# Patient Record
Sex: FEMALE | Race: WHITE | NOT HISPANIC OR LATINO | Employment: FULL TIME | URBAN - METROPOLITAN AREA
[De-identification: names, ages, dates, MRNs, and addresses within clinical notes are randomized per-mention and may not be internally consistent; named-entity substitution may affect disease eponyms.]

---

## 2017-01-05 ENCOUNTER — HOSPITAL ENCOUNTER (OUTPATIENT)
Dept: ULTRASOUND IMAGING | Facility: HOSPITAL | Age: 47
Discharge: HOME | End: 2017-01-05
Attending: INTERNAL MEDICINE | Admitting: INTERNAL MEDICINE

## 2017-01-05 DIAGNOSIS — E04.1 THYROID NODULE: ICD-10-CM

## 2017-01-05 PROCEDURE — 76536 US EXAM OF HEAD AND NECK: CPT

## 2017-02-08 ENCOUNTER — OFFICE VISIT (OUTPATIENT)
Dept: INTERNAL MEDICINE | Facility: CLINIC | Age: 47
End: 2017-02-08

## 2017-02-08 VITALS
BODY MASS INDEX: 24.49 KG/M2 | DIASTOLIC BLOOD PRESSURE: 80 MMHG | SYSTOLIC BLOOD PRESSURE: 100 MMHG | WEIGHT: 152.9 LBS | OXYGEN SATURATION: 97 % | HEART RATE: 117 BPM

## 2017-02-08 DIAGNOSIS — R53.83 FATIGUE, UNSPECIFIED TYPE: ICD-10-CM

## 2017-02-08 DIAGNOSIS — E04.1 THYROID NODULE: Primary | ICD-10-CM

## 2017-02-08 PROCEDURE — 99213 OFFICE O/P EST LOW 20 MIN: CPT | Performed by: INTERNAL MEDICINE

## 2017-02-08 RX ORDER — ZOLPIDEM TARTRATE 10 MG/1
TABLET ORAL
COMMUNITY
Start: 2017-02-07 | End: 2017-07-10

## 2017-02-08 NOTE — PROGRESS NOTES
Subjective   Froilan Dickens is a 46 y.o. female.     Chief Complaint   Patient presents with   • Fatigue         History of Present Illness   Fatigue and f/u on us thyroid results.  The following portions of the patient's history were reviewed and updated as appropriate: allergies, current medications, past family history, past medical history, past social history, past surgical history and problem list.    Review of Systems   Constitutional: Positive for fatigue. Negative for activity change, appetite change, chills, diaphoresis, fever and unexpected weight change.   HENT: Negative for congestion, ear discharge, ear pain, mouth sores, nosebleeds, sinus pressure, sneezing and sore throat.    Eyes: Negative for pain, discharge and itching.   Respiratory: Negative for cough, chest tightness, shortness of breath and wheezing.    Cardiovascular: Negative for chest pain, palpitations and leg swelling.   Gastrointestinal: Negative for abdominal pain, constipation, diarrhea, nausea and vomiting.   Endocrine: Negative for cold intolerance, heat intolerance, polydipsia and polyphagia.   Genitourinary: Negative for dysuria, flank pain, frequency, hematuria and urgency.   Musculoskeletal: Negative for arthralgias, back pain, gait problem, myalgias, neck pain and neck stiffness.   Skin: Negative for color change, pallor and rash.   Neurological: Negative for seizures, speech difficulty, numbness and headaches.   Psychiatric/Behavioral: Negative for agitation, confusion, decreased concentration and sleep disturbance. The patient is not nervous/anxious.      Visit Vitals   • /80   • Pulse 117   • Wt 152 lb 14.4 oz (69.4 kg)   • SpO2 97%   • BMI 24.49 kg/m2       Objective   Physical Exam   Constitutional: She appears well-developed.   HENT:   Head: Normocephalic.   Right Ear: External ear normal.   Left Ear: External ear normal.   Nose: Nose normal.   Mouth/Throat: Oropharynx is clear and moist.   Eyes: Conjunctivae are  normal. Pupils are equal, round, and reactive to light.   Neck: No JVD present. No thyromegaly present.   Cardiovascular: Normal rate, regular rhythm and normal heart sounds.  Exam reveals no friction rub.    No murmur heard.  Pulmonary/Chest: Effort normal and breath sounds normal. No respiratory distress. She has no wheezes. She has no rales.   Abdominal: Soft. Bowel sounds are normal. She exhibits no distension. There is no tenderness. There is no guarding.   Musculoskeletal: She exhibits no edema or tenderness.   Lymphadenopathy:     She has no cervical adenopathy.   Neurological: She displays normal reflexes. No cranial nerve deficit.   Skin: No rash noted.   Psychiatric: Her behavior is normal.   Nursing note and vitals reviewed.      Assessment/Plan   Froilan was seen today for fatigue.    Diagnoses and all orders for this visit:    Thyroid nodule  -     Ambulatory Referral to Endocrinology  Explained very tiny nodules on thyroid exam.She still insists on seeing Endo. Ref put in.  Fatigue, unspecified type    She had issues with throat and thinks fatigue related . Seeing ENT

## 2017-04-18 ENCOUNTER — OFFICE VISIT (OUTPATIENT)
Dept: INTERNAL MEDICINE | Facility: CLINIC | Age: 47
End: 2017-04-18

## 2017-04-18 VITALS
SYSTOLIC BLOOD PRESSURE: 90 MMHG | OXYGEN SATURATION: 98 % | BODY MASS INDEX: 25.65 KG/M2 | HEIGHT: 67 IN | WEIGHT: 163.4 LBS | HEART RATE: 105 BPM | DIASTOLIC BLOOD PRESSURE: 70 MMHG

## 2017-04-18 DIAGNOSIS — Z00.00 HEALTH CARE MAINTENANCE: Primary | ICD-10-CM

## 2017-04-18 DIAGNOSIS — R31.29 MICROSCOPIC HEMATURIA: ICD-10-CM

## 2017-04-18 LAB
ALBUMIN SERPL-MCNC: 4.8 G/DL (ref 3.2–4.8)
ALBUMIN/GLOB SERPL: 1.7 G/DL (ref 1.5–2.5)
ALP SERPL-CCNC: 112 U/L (ref 25–100)
ALT SERPL W P-5'-P-CCNC: 20 U/L (ref 7–40)
ANION GAP SERPL CALCULATED.3IONS-SCNC: 15 MMOL/L (ref 3–11)
ARTICHOKE IGE QN: 141 MG/DL (ref 0–130)
AST SERPL-CCNC: 26 U/L (ref 0–33)
BILIRUB BLD-MCNC: NEGATIVE MG/DL
BILIRUB SERPL-MCNC: 0.3 MG/DL (ref 0.3–1.2)
BUN BLD-MCNC: 11 MG/DL (ref 9–23)
BUN/CREAT SERPL: 13.8 (ref 7–25)
CALCIUM SPEC-SCNC: 9.3 MG/DL (ref 8.7–10.4)
CHLORIDE SERPL-SCNC: 107 MMOL/L (ref 99–109)
CHOLEST SERPL-MCNC: 212 MG/DL (ref 0–200)
CLARITY, POC: CLEAR
CO2 SERPL-SCNC: 19 MMOL/L (ref 20–31)
COLOR UR: YELLOW
CREAT BLD-MCNC: 0.8 MG/DL (ref 0.6–1.3)
GFR SERPL CREATININE-BSD FRML MDRD: 77 ML/MIN/1.73
GLOBULIN UR ELPH-MCNC: 2.9 GM/DL
GLUCOSE BLD-MCNC: 91 MG/DL (ref 70–100)
GLUCOSE UR STRIP-MCNC: NEGATIVE MG/DL
HDLC SERPL-MCNC: 71 MG/DL (ref 40–60)
KETONES UR QL: NEGATIVE
LEUKOCYTE EST, POC: NEGATIVE
NITRITE UR-MCNC: NEGATIVE MG/ML
PH UR: 6 [PH] (ref 5–8)
POTASSIUM BLD-SCNC: 4.6 MMOL/L (ref 3.5–5.5)
PROT SERPL-MCNC: 7.7 G/DL (ref 5.7–8.2)
PROT UR STRIP-MCNC: NEGATIVE MG/DL
RBC # UR STRIP: ABNORMAL /UL
SODIUM BLD-SCNC: 141 MMOL/L (ref 132–146)
SP GR UR: 1.02 (ref 1–1.03)
TRIGL SERPL-MCNC: 99 MG/DL (ref 0–150)
TSH SERPL DL<=0.05 MIU/L-ACNC: 1.01 MIU/ML (ref 0.35–5.35)
UROBILINOGEN UR QL: NORMAL

## 2017-04-18 PROCEDURE — 80053 COMPREHEN METABOLIC PANEL: CPT | Performed by: INTERNAL MEDICINE

## 2017-04-18 PROCEDURE — 80061 LIPID PANEL: CPT | Performed by: INTERNAL MEDICINE

## 2017-04-18 PROCEDURE — 84443 ASSAY THYROID STIM HORMONE: CPT | Performed by: INTERNAL MEDICINE

## 2017-04-18 PROCEDURE — 99396 PREV VISIT EST AGE 40-64: CPT | Performed by: INTERNAL MEDICINE

## 2017-04-18 PROCEDURE — 81003 URINALYSIS AUTO W/O SCOPE: CPT | Performed by: INTERNAL MEDICINE

## 2017-04-18 RX ORDER — ALPRAZOLAM 0.5 MG/1
TABLET ORAL
COMMUNITY
Start: 2017-04-04 | End: 2017-07-10

## 2017-04-18 RX ORDER — BREXPIPRAZOLE 0.5 MG/1
TABLET ORAL
COMMUNITY
Start: 2017-04-07

## 2017-04-18 RX ORDER — ATOMOXETINE 25 MG/1
CAPSULE ORAL
COMMUNITY
Start: 2017-04-05 | End: 2017-07-10

## 2017-04-18 NOTE — PROGRESS NOTES
"  Chief Complaint   Patient presents with   • Annual Exam         Reported Health  Good Yes  FairNo  PoorNo      Dental,Vision,Hearing  Regular dental visitsYes  Vision ProblemsNo  Hearing LossNo      Immunization Status:  Up To DateYes        Lifestyle  Healthy DietYes  Weight ConcernsYes  Regular ExerciseYes  Tobacco UseNo  Alcohol UseNo  Drug AbuseNo      Screening  Cancer ScreeningNo  Metabolic ScreeningNo  Risk ScreeningNo      Review of Systems   Constitutional: Negative for activity change, appetite change, chills, diaphoresis, fatigue, fever and unexpected weight change.   HENT: Negative for congestion, ear discharge, ear pain, mouth sores, nosebleeds, sinus pressure, sneezing and sore throat.    Eyes: Negative for pain, discharge and itching.   Respiratory: Negative for cough, chest tightness, shortness of breath and wheezing.    Cardiovascular: Negative for chest pain, palpitations and leg swelling.   Gastrointestinal: Negative for abdominal pain, constipation, diarrhea, nausea and vomiting.   Endocrine: Negative for cold intolerance, heat intolerance, polydipsia and polyphagia.   Genitourinary: Negative for dysuria, flank pain, frequency, hematuria and urgency.   Musculoskeletal: Negative for arthralgias, back pain, gait problem, myalgias, neck pain and neck stiffness.   Skin: Negative for color change, pallor and rash.   Neurological: Negative for seizures, speech difficulty, numbness and headaches.   Psychiatric/Behavioral: Negative for agitation, confusion, decreased concentration and sleep disturbance. The patient is not nervous/anxious.      BP 90/70  Pulse 105  Ht 66.5\" (168.9 cm)  Wt 163 lb 6.4 oz (74.1 kg)  SpO2 98%  BMI 25.98 kg/m2    Physical Exam   Constitutional: She is oriented to person, place, and time. She appears well-developed.   HENT:   Head: Normocephalic.   Right Ear: External ear normal.   Left Ear: External ear normal.   Nose: Nose normal.   Mouth/Throat: Oropharynx is clear " and moist.   Eyes: Conjunctivae are normal. Pupils are equal, round, and reactive to light.   Neck: No JVD present. No thyromegaly present.   Cardiovascular: Normal rate, regular rhythm and normal heart sounds.  Exam reveals no friction rub.    No murmur heard.  Pulmonary/Chest: Effort normal and breath sounds normal. No respiratory distress. She has no wheezes. She has no rales.   Abdominal: Soft. Bowel sounds are normal. She exhibits no distension. There is no tenderness. There is no guarding.   Musculoskeletal: She exhibits no edema or tenderness.   Lymphadenopathy:     She has no cervical adenopathy.   Neurological: She is oriented to person, place, and time. She has normal reflexes. She displays normal reflexes. No cranial nerve deficit.   Skin: No rash noted.   Psychiatric: She has a normal mood and affect. Her behavior is normal.   Nursing note and vitals reviewed.      Diet and Exercise    Healthy Diet Yes  Adequate DietYes  Poor DietNo  Adequate Exercise RegimenYes  Inadequate Exercise RegimenNo      Cervical Cancer Screening    Risks and benefits discussedYes  Screening currentYes  PAP Done Today OrderedNo  Screening Not IndicatedNo  Pap Every 3 yearsYes  Screening Done By GYNYes    Breast Cancer screening  Risks and Benefits DiscussedNo  Self Breast Exam taughtNo  Monthly Self Exam AdvisedYes  Screening CurrentNo  Mammogram OrderedNo  Screening Not IndicatedNo  Screening Managed By GYNYes  Patient DeclinesNo      STD Testing  ChlamydiaNo  GonorrheaNo  HIVNo      Osteoporosis Screening  Not indicated  Colorectal Cancer Screening    Screen for her at 51  Metabolic Screening  GlucoseYes  LipidsYes  CBCYes  TSHYes  UAYes  CMPYes  25OHYes      Immunizations  Risks and benefits discussedYes  Immunizations Up To DateYes  Immunizations NeededNo  Immunizations Per OrdersNo  Patient DNoeclines      Preventative Counseling  NutritionYes  Aerobic ExerciseYes  Weight Bearing ExerciseYes  Weight LossNo  Calcium  SupplementsYes  Vitamin D SupplementsYes  Reproductive HealthNo  Cardiovascular Risk ReductionYes  Tobacco CessationNo  Alcohol UseNo  Sunscreen UseYes  Self Skin ExaminationNo  Helmet UseNo  Seat Belt UseYes  Fall Risk ReductionNo  Advanced Directive PlanningNo      Patient Discussion  PatientYes  FamilyNo  CounselingYes  Froilan was seen today for annual exam.    Diagnoses and all orders for this visit:    Health care maintenance  -     POC Urinalysis Dipstick, Automated  -     Comprehensive Metabolic Panel  -     Lipid Panel  -     TSH    Microscopic hematuria  -     Urinalysis with microscope; Future    Sees april Stoll who writes for her strattera and xanax.

## 2017-07-10 ENCOUNTER — OFFICE VISIT (OUTPATIENT)
Dept: ENDOCRINOLOGY | Facility: CLINIC | Age: 47
End: 2017-07-10

## 2017-07-10 VITALS
DIASTOLIC BLOOD PRESSURE: 68 MMHG | HEART RATE: 102 BPM | BODY MASS INDEX: 27.31 KG/M2 | WEIGHT: 174 LBS | OXYGEN SATURATION: 99 % | SYSTOLIC BLOOD PRESSURE: 100 MMHG | HEIGHT: 67 IN

## 2017-07-10 DIAGNOSIS — E04.1 CYST OF THYROID DETERMINED BY ULTRASOUND: Primary | ICD-10-CM

## 2017-07-10 PROCEDURE — 76536 US EXAM OF HEAD AND NECK: CPT | Performed by: INTERNAL MEDICINE

## 2017-07-10 PROCEDURE — 99243 OFF/OP CNSLTJ NEW/EST LOW 30: CPT | Performed by: INTERNAL MEDICINE

## 2017-07-10 NOTE — PROGRESS NOTES
Chief Complaint   Patient presents with   • Thyroid Nodule     Consult for Dr. Gutiérrez        HPI:   Froilan Dickens is a 47 y.o.female sent in consultation by Prema Gutiérrez DO for further evaluation of her thyroid gland. Her history is as follows:    - pt reports initially having fluctuations in her TSH level in 2015 - 2016. Pt was on Seroquel and then Lamictal at the time. Pt's TFT's were then normal when those medications were discontinued. Pt had fatigue from insomnia which has improved since seeing a Sleep Therapist. Had hair los which has also resolved.  Lab Results   Component Value Date    TSH 1.015 04/18/2017       - Thyroid US completed at  radiology in  11/2016 and 01/2017 showed a micronodular pattern with microcysts but no discrete dominant nodules.     FMH: no known thyroid cancer, no known thyroid disease  PMH: no h/o irradiation of head, neck, or chest    Review of Systems   Constitutional: Positive for fatigue.   HENT: Positive for sore throat (occasional).    Eyes: Negative.    Respiratory: Negative.    Cardiovascular: Negative.    Gastrointestinal: Negative.    Endocrine: Negative.    Genitourinary: Negative.    Musculoskeletal: Negative.    Skin: Negative.    Allergic/Immunologic: Negative.    Neurological: Negative.    Hematological: Negative.    Psychiatric/Behavioral:        H/o depression     Past Medical History:   Diagnosis Date   • Endometriosis    • History of blood clots    • Hypertension    • Migraines      family history includes Diabetes in her father; Heart attack in her paternal uncle; Hypertension in her father; Migraines in her mother; Prostate cancer in her father; Stroke in her maternal grandfather and maternal grandmother.  Past Surgical History:   Procedure Laterality Date   • NO PAST SURGERIES       Social History   Substance Use Topics   • Smoking status: Never Smoker   • Smokeless tobacco: Not on file   • Alcohol use Yes      Comment: occasionally       Current  "Outpatient Prescriptions:   •  REXULTI 0.5 MG tablet, , Disp: , Rfl:   Allergies   Allergen Reactions   • Ampicillin    • Lamictal  [Lamotrigine]        /68  Pulse 102  Ht 66.5\" (168.9 cm)  Wt 174 lb (78.9 kg)  SpO2 99%  BMI 27.66 kg/m2  Physical Exam   Constitutional: She is oriented to person, place, and time. She appears well-developed. No distress.   HENT:   Head: Normocephalic.   Mouth/Throat: Oropharynx is clear and moist.   Eyes: Conjunctivae and EOM are normal. Pupils are equal, round, and reactive to light.   Neck: No tracheal deviation present. No thyromegaly present.   No palpable thyroid nodules     Cardiovascular: Normal rate, regular rhythm and normal heart sounds.    No murmur heard.  No tachycardia on my exam   Pulmonary/Chest: Effort normal and breath sounds normal. No respiratory distress.   Lymphadenopathy:     She has no cervical adenopathy.   Neurological: She is alert and oriented to person, place, and time. No cranial nerve deficit.   Skin: Skin is warm and dry. She is not diaphoretic. No erythema.   Psychiatric: She has a normal mood and affect. Her behavior is normal.   Vitals reviewed.    LABS/IMAGING: outside records reviewed and summarized [facility]  Lab Results   Component Value Date    TSH 1.015 04/18/2017     PROCEDURES (in office):  Thyroid Ultrasound Report  [facility]  [place], [place]    Date: 07/10/2017    Indication: thyroid nodule   Comparison Imaging:  radiology Thyroid US 11/02/2016, 01/05/2017  Clinical History: 47 y.o. Female with normal thyroid function and h/o abnormal thyroid US    Real time high resolution imaging of the thyroid gland was performed in transverse and longitudinal planes.  The right lobe measured 4.37 cm in Length x 1.33 cm in AP diameter x 1.92 cm in TV dimension.  The isthmus measured 0.24 cm in thickness.  The left thyroid lobe measured 4.09 cm in length x 1.30 cm in AP diameter x 1.72 cm in TV dimension.    The thyroid gland " appeared overall isoechoic with diffusely heterogeneous echotexture and normal vascularity.    No discrete solid nodules were identified in either lobe or isthmus.    Multiple small (2-3 mm) cysts were seen scattered throughout both lobes.     No pathologic lymph nodes were seen.     IMPRESSION:   1) The thyroid gland was normal in size by measured dimensions.   2) No discrete solid nodules were identified in either lobe or isthmus.  3) Multiple small (2-3 mm) cysts were seen scattered throughout both lobes.    RECOMMENDATIONS: Repeat Thyroid US recommended if changes in the gland are noted on physical exam.    ASSESSMENT/PLAN:  1) small, benign thyroid cysts:  Thyroid US performed in clinic today showed -  - The thyroid gland was normal in size by measured dimensions.   - No discrete solid nodules were identified in either lobe or isthmus.  -  Multiple small (2-3 mm) cysts were seen scattered throughout both lobes.  RECOMMENDATIONS: Repeat Thyroid US recommended if changes in the gland are noted on physical exam.    Reviewed images with patient. The small cysts identified do not meet criteria for FNA and do not need to be followed with serial imaging.  Repeat Thyroid US recommended if changes in the gland are noted on physical exam.    2) h/o abnormal TFTs  - discussed with patient that her changes in TSH in the past may have been due to seroquel which has been reported to cause thyroid hormone dysfunction in some patients.     RTC as needed

## 2017-07-10 NOTE — PROGRESS NOTES
Thyroid Ultrasound Report  [facility]  [place], [place]    Date: 07/10/2017    Indication: thyroid nodule   Comparison Imaging:  radiology Thyroid US 11/02/2016, 01/05/2017  Clinical History: 47 y.o. Female with normal thyroid function and h/o abnormal thyroid US    Real time high resolution imaging of the thyroid gland was performed in transverse and longitudinal planes.  The right lobe measured 4.37 cm in Length x 1.33 cm in AP diameter x 1.92 cm in TV dimension.  The isthmus measured 0.24 cm in thickness.  The left thyroid lobe measured 4.09 cm in length x 1.30 cm in AP diameter x 1.72 cm in TV dimension.    The thyroid gland appeared overall isoechoic with diffusely heterogeneous echotexture and normal vascularity.    No discrete solid nodules were identified in either lobe or isthmus.    Multiple small (2-3 mm) cysts were seen scattered throughout both lobes.     No pathologic lymph nodes were seen.     IMPRESSION:   1) The thyroid gland was normal in size by measured dimensions.   2) No discrete solid nodules were identified in either lobe or isthmus.  3) Multiple small (2-3 mm) cysts were seen scattered throughout both lobes.    RECOMMENDATIONS: Repeat Thyroid US recommended if changes in the gland are noted on physical exam.

## 2017-07-11 PROBLEM — E04.1 CYST OF THYROID DETERMINED BY ULTRASOUND: Status: ACTIVE | Noted: 2017-07-11

## 2021-02-02 ENCOUNTER — TELEHEALTH PROVIDED OTHER THAN IN PATIENT'S HOME (OUTPATIENT)
Dept: URBAN - METROPOLITAN AREA TELEHEALTH 1 | Facility: TELEHEALTH | Age: 51
End: 2021-02-02

## 2021-02-02 DIAGNOSIS — R10.13 EPIGASTRIC PAIN: ICD-10-CM

## 2021-02-02 DIAGNOSIS — R14.0 ABDOMINAL DISTENSION (GASEOUS): ICD-10-CM

## 2021-02-02 DIAGNOSIS — R13.10 DYSPHAGIA, UNSPECIFIED: ICD-10-CM

## 2021-02-02 DIAGNOSIS — K30 FUNCTIONAL DYSPEPSIA: ICD-10-CM

## 2021-02-02 DIAGNOSIS — K21.9 GASTRO-ESOPHAGEAL REFLUX DISEASE WITHOUT ESOPHAGITIS: ICD-10-CM

## 2021-02-02 PROCEDURE — 99243 OFF/OP CNSLTJ NEW/EST LOW 30: CPT | Mod: 95 | Performed by: INTERNAL MEDICINE

## 2021-03-21 ENCOUNTER — APPOINTMENT (OUTPATIENT)
Dept: PREADMISSION TESTING | Facility: HOSPITAL | Age: 51
End: 2021-03-21

## 2021-03-21 LAB — SARS-COV-2 RNA NOSE QL NAA+PROBE: NOT DETECTED

## 2021-03-21 PROCEDURE — C9803 HOPD COVID-19 SPEC COLLECT: HCPCS

## 2021-03-21 PROCEDURE — U0004 COV-19 TEST NON-CDC HGH THRU: HCPCS

## 2021-03-24 ENCOUNTER — OFFICE (OUTPATIENT)
Dept: URBAN - METROPOLITAN AREA PATHOLOGY 4 | Facility: PATHOLOGY | Age: 51
End: 2021-03-24

## 2021-03-24 DIAGNOSIS — K29.80 DUODENITIS WITHOUT BLEEDING: ICD-10-CM

## 2021-03-24 DIAGNOSIS — K20.0 EOSINOPHILIC ESOPHAGITIS: ICD-10-CM

## 2021-03-24 DIAGNOSIS — K31.7 POLYP OF STOMACH AND DUODENUM: ICD-10-CM

## 2021-03-24 DIAGNOSIS — K29.50 UNSPECIFIED CHRONIC GASTRITIS WITHOUT BLEEDING: ICD-10-CM

## 2021-03-24 PROCEDURE — 88305 TISSUE EXAM BY PATHOLOGIST: CPT | Performed by: INTERNAL MEDICINE

## 2021-03-25 PROBLEM — K30 DYSPEPSIA: Status: ACTIVE | Noted: 2021-03-25

## 2021-04-06 LAB
C-REACTIVE PROTEIN, QUANT: 5 MG/L (ref 0–10)
CBC WITH DIFFERENTIAL/PLATELET: BASO (ABSOLUTE): 0 X10E3/UL (ref 0–0.2)
CBC WITH DIFFERENTIAL/PLATELET: BASOS: 1 %
CBC WITH DIFFERENTIAL/PLATELET: EOS (ABSOLUTE): 0.6 X10E3/UL — HIGH (ref 0–0.4)
CBC WITH DIFFERENTIAL/PLATELET: EOS: 8 %
CBC WITH DIFFERENTIAL/PLATELET: HEMATOCRIT: 41.1 % (ref 34–46.6)
CBC WITH DIFFERENTIAL/PLATELET: HEMATOLOGY COMMENTS: (no result)
CBC WITH DIFFERENTIAL/PLATELET: HEMOGLOBIN: 13.9 G/DL (ref 11.1–15.9)
CBC WITH DIFFERENTIAL/PLATELET: IMMATURE CELLS: (no result)
CBC WITH DIFFERENTIAL/PLATELET: IMMATURE GRANS (ABS): 0 X10E3/UL (ref 0–0.1)
CBC WITH DIFFERENTIAL/PLATELET: IMMATURE GRANULOCYTES: 0 %
CBC WITH DIFFERENTIAL/PLATELET: LYMPHS (ABSOLUTE): 2.8 X10E3/UL (ref 0.7–3.1)
CBC WITH DIFFERENTIAL/PLATELET: LYMPHS: 33 %
CBC WITH DIFFERENTIAL/PLATELET: MCH: 29.6 PG (ref 26.6–33)
CBC WITH DIFFERENTIAL/PLATELET: MCHC: 33.8 G/DL (ref 31.5–35.7)
CBC WITH DIFFERENTIAL/PLATELET: MCV: 87 FL (ref 79–97)
CBC WITH DIFFERENTIAL/PLATELET: MONOCYTES(ABSOLUTE): 0.6 X10E3/UL (ref 0.1–0.9)
CBC WITH DIFFERENTIAL/PLATELET: MONOCYTES: 7 %
CBC WITH DIFFERENTIAL/PLATELET: NEUTROPHILS (ABSOLUTE): 4.5 X10E3/UL (ref 1.4–7)
CBC WITH DIFFERENTIAL/PLATELET: NEUTROPHILS: 51 %
CBC WITH DIFFERENTIAL/PLATELET: NRBC: (no result)
CBC WITH DIFFERENTIAL/PLATELET: PLATELETS: 249 X10E3/UL (ref 150–450)
CBC WITH DIFFERENTIAL/PLATELET: RBC: 4.7 X10E6/UL (ref 3.77–5.28)
CBC WITH DIFFERENTIAL/PLATELET: RDW: 12.8 % (ref 11.7–15.4)
CBC WITH DIFFERENTIAL/PLATELET: WBC: 8.5 X10E3/UL (ref 3.4–10.8)
CELIAC DISEASE COMPREHENSIVE: DEAMIDATED GLIADIN ABS, IGA: 7 UNITS (ref 0–19)
CELIAC DISEASE COMPREHENSIVE: DEAMIDATED GLIADIN ABS, IGG: 2 UNITS (ref 0–19)
CELIAC DISEASE COMPREHENSIVE: ENDOMYSIAL ANTIBODY IGA: NEGATIVE
CELIAC DISEASE COMPREHENSIVE: IMMUNOGLOBULIN A, QN, SERUM: 123 MG/DL (ref 87–352)
CELIAC DISEASE COMPREHENSIVE: T-TRANSGLUTAMINASE (TTG) IGA: <2 U/ML
CELIAC DISEASE COMPREHENSIVE: T-TRANSGLUTAMINASE (TTG) IGG: <2 U/ML
COMP. METABOLIC PANEL (14): A/G RATIO: 1.6 (ref 1.2–2.2)
COMP. METABOLIC PANEL (14): ALBUMIN: 4.3 G/DL (ref 3.8–4.8)
COMP. METABOLIC PANEL (14): ALKALINE PHOSPHATASE: 111 IU/L (ref 39–117)
COMP. METABOLIC PANEL (14): ALT (SGPT): 21 IU/L (ref 0–32)
COMP. METABOLIC PANEL (14): AST (SGOT): 20 IU/L (ref 0–40)
COMP. METABOLIC PANEL (14): BILIRUBIN, TOTAL: <0.2 MG/DL
COMP. METABOLIC PANEL (14): BUN/CREATININE RATIO: 9 (ref 9–23)
COMP. METABOLIC PANEL (14): BUN: 9 MG/DL (ref 6–24)
COMP. METABOLIC PANEL (14): CALCIUM: 9.3 MG/DL (ref 8.7–10.2)
COMP. METABOLIC PANEL (14): CARBON DIOXIDE, TOTAL: 23 MMOL/L (ref 20–29)
COMP. METABOLIC PANEL (14): CHLORIDE: 106 MMOL/L (ref 96–106)
COMP. METABOLIC PANEL (14): CREATININE: 1 MG/DL (ref 0.57–1)
COMP. METABOLIC PANEL (14): EGFR IF AFRICN AM: 76 ML/MIN/1.73 (ref 59–?)
COMP. METABOLIC PANEL (14): EGFR IF NONAFRICN AM: 66 ML/MIN/1.73 (ref 59–?)
COMP. METABOLIC PANEL (14): GLOBULIN, TOTAL: 2.7 G/DL (ref 1.5–4.5)
COMP. METABOLIC PANEL (14): GLUCOSE: 98 MG/DL (ref 65–99)
COMP. METABOLIC PANEL (14): POTASSIUM: 4.1 MMOL/L (ref 3.5–5.2)
COMP. METABOLIC PANEL (14): PROTEIN, TOTAL: 7 G/DL (ref 6–8.5)
COMP. METABOLIC PANEL (14): SODIUM: 142 MMOL/L (ref 134–144)
FERRITIN, SERUM: 40 NG/ML (ref 15–150)
IRON AND TIBC: IRON BIND.CAP.(TIBC): 375 UG/DL (ref 250–450)
IRON AND TIBC: IRON SATURATION: 13 % — LOW (ref 15–55)
IRON AND TIBC: IRON: 47 UG/DL (ref 27–159)
IRON AND TIBC: UIBC: 328 UG/DL (ref 131–425)
Lab: <20 UNITS
Lab: <20 UNITS
Lab: NEGATIVE
Lab: NEGATIVE
VITAMIN D, 25-HYDROXY: 59.7 NG/ML (ref 30–100)

## 2021-06-07 ENCOUNTER — APPOINTMENT (OUTPATIENT)
Dept: PREADMISSION TESTING | Facility: HOSPITAL | Age: 51
End: 2021-06-07

## 2021-06-07 LAB — SARS-COV-2 RNA PNL SPEC NAA+PROBE: NOT DETECTED

## 2021-06-07 PROCEDURE — C9803 HOPD COVID-19 SPEC COLLECT: HCPCS

## 2021-06-07 PROCEDURE — U0004 COV-19 TEST NON-CDC HGH THRU: HCPCS

## 2021-06-10 ENCOUNTER — OFFICE (OUTPATIENT)
Dept: URBAN - METROPOLITAN AREA PATHOLOGY 4 | Facility: PATHOLOGY | Age: 51
End: 2021-06-10

## 2021-06-10 ENCOUNTER — AMBULATORY SURGICAL CENTER (OUTPATIENT)
Dept: URBAN - METROPOLITAN AREA SURGERY 10 | Facility: SURGERY | Age: 51
End: 2021-06-10

## 2021-06-10 DIAGNOSIS — K64.0 FIRST DEGREE HEMORRHOIDS: ICD-10-CM

## 2021-06-10 DIAGNOSIS — D12.4 BENIGN NEOPLASM OF DESCENDING COLON: ICD-10-CM

## 2021-06-10 DIAGNOSIS — R14.0 ABDOMINAL DISTENSION (GASEOUS): ICD-10-CM

## 2021-06-10 DIAGNOSIS — R10.84 GENERALIZED ABDOMINAL PAIN: ICD-10-CM

## 2021-06-10 PROCEDURE — 45385 COLONOSCOPY W/LESION REMOVAL: CPT | Performed by: INTERNAL MEDICINE

## 2021-06-10 PROCEDURE — 88305 TISSUE EXAM BY PATHOLOGIST: CPT | Performed by: INTERNAL MEDICINE

## 2021-08-24 ENCOUNTER — TELEHEALTH PROVIDED OTHER THAN IN PATIENT'S HOME (OUTPATIENT)
Dept: URBAN - METROPOLITAN AREA TELEHEALTH 1 | Facility: TELEHEALTH | Age: 51
End: 2021-08-24

## 2021-08-24 VITALS — HEIGHT: 67 IN | WEIGHT: 177 LBS

## 2021-08-24 DIAGNOSIS — R13.10 DYSPHAGIA, UNSPECIFIED: ICD-10-CM

## 2021-08-24 DIAGNOSIS — R11.2 NAUSEA WITH VOMITING, UNSPECIFIED: ICD-10-CM

## 2021-08-24 DIAGNOSIS — K21.9 GASTRO-ESOPHAGEAL REFLUX DISEASE WITHOUT ESOPHAGITIS: ICD-10-CM

## 2021-08-24 DIAGNOSIS — R14.0 ABDOMINAL DISTENSION (GASEOUS): ICD-10-CM

## 2021-08-24 DIAGNOSIS — K30 FUNCTIONAL DYSPEPSIA: ICD-10-CM

## 2021-08-24 PROCEDURE — 99214 OFFICE O/P EST MOD 30 MIN: CPT | Mod: 95 | Performed by: NURSE PRACTITIONER

## 2022-09-07 ENCOUNTER — OFFICE (OUTPATIENT)
Dept: URBAN - METROPOLITAN AREA CLINIC 4 | Facility: CLINIC | Age: 52
End: 2022-09-07

## 2022-09-07 VITALS — HEIGHT: 67 IN | DIASTOLIC BLOOD PRESSURE: 68 MMHG | WEIGHT: 177 LBS | SYSTOLIC BLOOD PRESSURE: 112 MMHG

## 2022-09-07 DIAGNOSIS — R11.2 NAUSEA WITH VOMITING, UNSPECIFIED: ICD-10-CM

## 2022-09-07 DIAGNOSIS — R13.10 DYSPHAGIA, UNSPECIFIED: ICD-10-CM

## 2022-09-07 DIAGNOSIS — K30 FUNCTIONAL DYSPEPSIA: ICD-10-CM

## 2022-09-07 PROCEDURE — 99214 OFFICE O/P EST MOD 30 MIN: CPT | Performed by: NURSE PRACTITIONER

## 2022-09-07 RX ORDER — PRUCALOPRIDE 2 MG/1
2 TABLET, FILM COATED ORAL
Qty: 90 | Refills: 4 | Status: ACTIVE